# Patient Record
Sex: FEMALE | Race: WHITE
[De-identification: names, ages, dates, MRNs, and addresses within clinical notes are randomized per-mention and may not be internally consistent; named-entity substitution may affect disease eponyms.]

---

## 2017-08-03 ENCOUNTER — HOSPITAL ENCOUNTER (OUTPATIENT)
Dept: HOSPITAL 63 - MAMMO | Age: 67
Discharge: HOME | End: 2017-08-03
Attending: SPECIALIST
Payer: MEDICARE

## 2017-08-03 DIAGNOSIS — Z00.00: ICD-10-CM

## 2017-08-03 DIAGNOSIS — Z12.31: Primary | ICD-10-CM

## 2017-08-03 PROCEDURE — 77063 BREAST TOMOSYNTHESIS BI: CPT

## 2017-08-03 NOTE — RAD
DATE: 8/3/2017



EXAM: MAMMO ADEOLA SCREENING BILATERAL



HISTORY: Routine screening



COMPARISON: 8/2/2016



This study was interpreted with the benefit of Computerized Aided Detection

(CAD).



The breast parenchyma shows scattered fibroglandular densities. Breast

parenchyma level B.



FINDINGS: 2-D and 3-D tomosynthesis imaging was performed in CC and MLO

projections. The fibroglandular pattern is nodular in character. The smooth

nodule seen inferomedially in the right breast on the previous study has

regressed. A prior ultrasound study suggests that this was a cyst. No new or

enlarging breast densities are seen. Benign type calcifications are present.

No suspicious microcalcifications have developed. 





IMPRESSION: Stable mammograms without evidence of malignancy.





BI-RADS CATEGORY: 2 BENIGN FINDING(S)



RECOMMENDED FOLLOW-UP: 12M 12 MONTH FOLLOW-UP



PQRS compliance statement: Patient information was entered into a reminder

system with a target due date     for the next mammogram.



Mammography is a sensitive method for finding small breast cancers, but it

does not detect them all and is not a substitute for careful clinical

examination.  A negative mammogram does not negate a clinically suspicious

finding and should not result in delay in biopsying a clinically suspicious

abnormality.



"Our facility is accredited by the American [employer] Mammography

Program."

## 2018-08-14 ENCOUNTER — HOSPITAL ENCOUNTER (OUTPATIENT)
Dept: HOSPITAL 63 - DXRAD | Age: 68
Discharge: HOME | End: 2018-08-14
Attending: SPECIALIST
Payer: MEDICARE

## 2018-08-14 DIAGNOSIS — Z13.820: ICD-10-CM

## 2018-08-14 DIAGNOSIS — Z12.31: Primary | ICD-10-CM

## 2018-08-14 DIAGNOSIS — M85.88: ICD-10-CM

## 2018-08-14 DIAGNOSIS — E78.00: ICD-10-CM

## 2018-08-14 PROCEDURE — 77080 DXA BONE DENSITY AXIAL: CPT

## 2018-08-14 PROCEDURE — 77063 BREAST TOMOSYNTHESIS BI: CPT

## 2018-08-14 PROCEDURE — 77067 SCR MAMMO BI INCL CAD: CPT

## 2018-08-14 NOTE — RAD
Bone densitometry 8/14/2018 10:16 AM

 

Indication: screening, ov failure, osteopenia

 

Comparison Study: DEXA scan August 2, 2016..

 

Discussion:   Bone Densitometry was performed with dual photon absorption 

of the lumbar spine and proximal femurs.

 

Lumbar Spine:  Bone average density is 1.069g/cm2 for L1. T-Score is -0.9 

(prior   T score -1.9).

 

Right femoral neck:  Bone average density is 0.758 g/cm2.  T-Score is 

-2.0.  (Prior T score -2.3)

 

 

IMPRESSION: 

 

1. Apparent increased bone marrow density of the lumbar spine in the 

interim with T score now in the low normal range.

 

2. Persistent significant osteopenia of the left femoral neck.

 

3. Continued imaging follow-up recommended.

 

Note: Definitions established by the World Health Organization:

   Normal: T-score is -1.0 or above.

   Osteopenia: T-score is between -1.0 and -2.5.

   Osteoporosis: T-score is -2.5 or below. 

 

 

Electronically signed by: Barry Mi MD (8/14/2018 1:59 PM) [id number]

## 2018-08-15 NOTE — RAD
3d digital tomography Bilateral



History:  Routine screening



Technique:  Bilateral 3d digital tomographic views were obtained.  In

addition, CAD - computer aided detection was utilized. 



Comparison:  12/7/2012, 12/10/2013, 8/2/2016, 8/3/2017.



Findings:



Breast Tissue Density C :  The breast tissue is heterogeneously dense.

Scattered fibroglandular elements may obscure underlying pathology.



There are no suspicious masses, microcalcifications or areas of architectural

distortion.



Impression:



No suspicious findings.



BI-RADS Category 1:  Negative.



Normal interval followup.



The patient will receive a letter with the results in the mail.



Your mammogram demonstrates that you have dense breast tissue, which could

hide abnormalities, and if you have other risk factors for breast cancer that

have been identified, you might benefit from supplemental screening tests that

may be suggested by your ordering physician.  Dense breast tissue, in and of

itself, is a relatively common condition.  This information is not provided to

cause undue concern, but rather to raise your awareness and to promote

discussion with your physician regarding the presence of other risk factors,

in addition to dense breast tissue. A report of your mammography results will

be sent to you and your physician.  You should contact your physician if you

have any questions or concerns regarding this report.





A mammogram does not have 100% sensitivity and therefore a negative imaging

study should not delay further work up of a suspicious abnormality.



Patient information is entered into the [facility] reminder system using [employer] with

a target due date for the next screening mammogram.  The patient will receive

a reminder.



"Our facility is accredited by the American [employer] Mammography

Program."

## 2019-08-19 ENCOUNTER — HOSPITAL ENCOUNTER (OUTPATIENT)
Dept: HOSPITAL 63 - MAMMO | Age: 69
Discharge: HOME | End: 2019-08-19
Attending: SPECIALIST
Payer: MEDICARE

## 2019-08-19 DIAGNOSIS — N64.89: ICD-10-CM

## 2019-08-19 DIAGNOSIS — Z12.31: Primary | ICD-10-CM

## 2019-08-19 PROCEDURE — 77067 SCR MAMMO BI INCL CAD: CPT

## 2019-08-19 PROCEDURE — 77063 BREAST TOMOSYNTHESIS BI: CPT

## 2019-08-19 NOTE — RAD
DATE: 8/19/2019.



EXAM: MAMMO ADEOLA SCREENING BILATERAL



HISTORY: Routine mammogram.



COMPARISON: Previous mammogram from 2018 and 2017.



This study was interpreted with the benefit of Computerized Aided Detection

(CAD).



FINDINGS:



Breast Density: HETERO  The breast parenchyma Is heterogeneously dense, which

could reduce sensitivity of mammography. Breast parenchyma level C. 



The skin and nipples are within normal limits.  No suspicious calcifications,

spiculated mass or area of architectural distortion.  Stable bilateral nodular

opacities are seen.







IMPRESSION: No mammographic evidence of malignancy.







BI-RADS CATEGORY: 2 BENIGN FINDING(S)



RECOMMENDED FOLLOW-UP: 12M 12 MONTH FOLLOW-UP



PQRS compliance statement: Patient information was entered into a reminder

system with a target due date for the next mammogram.



Mammography is a sensitive method for finding small breast cancers, but it

does not detect them all and is not a substitute for careful clinical

examination.  A negative mammogram does not negate a clinically suspicious

finding and should not result in delay in biopsying a clinically suspicious

abnormality.



"Our facility is accredited by the American [employer] of [employer] Mammography

Program."

## 2021-08-05 ENCOUNTER — HOSPITAL ENCOUNTER (OUTPATIENT)
Dept: HOSPITAL 63 - MAMMO | Age: 71
End: 2021-08-05
Attending: SPECIALIST
Payer: MEDICARE

## 2021-08-05 DIAGNOSIS — Z00.00: ICD-10-CM

## 2021-08-05 DIAGNOSIS — Z78.0: ICD-10-CM

## 2021-08-05 DIAGNOSIS — Z12.31: Primary | ICD-10-CM

## 2021-08-05 PROCEDURE — 77063 BREAST TOMOSYNTHESIS BI: CPT

## 2021-08-05 PROCEDURE — 77067 SCR MAMMO BI INCL CAD: CPT

## 2021-08-05 PROCEDURE — 77080 DXA BONE DENSITY AXIAL: CPT

## 2021-08-05 NOTE — RAD
EXAM: DUAL ENERGY X-RAY ABSORPTIOMETRY (DEXA).



HISTORY: Postmenopausal screening.



FINDINGS: The lowest measured T-score is -1.6 in the right hip, based on a bone mineral density of 0.
754 g/cm^2. Refer to the worksheets for full detail.



There has been a 1.2 percent decrease in density of the right hip and 3.4 percent increase in density
 of the lumbar spine compared to a study performed 8/2/2016.



IMPRESSION:

1. Low bone mass. Bone mineral density yields a T-score between -1.0 and -2.5. Fracture risk is incre
ased.

2. FRAX report: Not calculated.





METHODOLOGY: Dual energy x-ray absorptiometry was performed to measure bone mineral density. The foll
owing analysis is based on the 2019 Official Positions of the International Society for Clinical Dens
itometry: 



Measurements of the hips and the average of L1-L4 are preferred. When the spine and/or hip cannot be 
feasibly measured or interpreted, or in the setting of hyperparathyroidism, distal radial bone minera
l density may be measured. 



The lumbar spine T-score is based on the average bone mineral density of L1-L4. In the setting of art
ifact or anatomic abnormality, some lumbar levels may be excluded, and the remaining levels used for 
calculation. A single lumbar level is not used for diagnosis, and if only a single level is available
 for assessment, another anatomic site will be used to assign a diagnosis.



The hip T-score is based on the bone mineral density measurement of the femoral neck or total proxima
l femur of either side, whichever is lowest. Bilateral mean values are not used for diagnosis.



The forearm T-score is derived from 33% of the distal radius of the nondominant forearm.



Electronically signed by: Kami Vasquez MD (8/5/2021 12:19 PM) [id number]

## 2021-08-05 NOTE — RAD
EXAM: Bilateral digital screening mammogram with tomosynthesis.



HISTORY: 70-year-old female presents for screening mammography.



TECHNIQUE: Full-field digital craniocaudal and mediolateral oblique 2D and 3D tomosynthesis images of
 both breasts are obtained for evaluation. Computer aided detection was applied.



COMPARISON: 8/19/2019



BREAST PARENCHYMAL DENSITY: Level C - Heterogeneously dense.



FINDINGS: There is no new suspicious mass, microcalcification or region of architectural distortion. 
There is stable areas of benign nodularity and asymmetry within both breasts.



IMPRESSION: BI-RADS Category 2: Benign finding(s). 



RECOMMENDATION: Annual mammography is recommended.



If your mammogram demonstrates that you have dense breast tissue, which could hide abnormalities, and
 if you have other risk factors for breast cancer that have been identified, you might benefit from s
upplemental screening tests that may be suggested by your ordering physician.  Dense breast tissue, i
n and of itself, is a relatively common condition.  This information is not provided to cause undue c
oncern, but rather to raise your awareness and to promote discussion with your physician regarding th
e presence of other risk factors, in addition to dense breast tissue. A report of your mammography re
sults will be sent to you and your physician.  You should contact your physician if you have any ques
tions or concerns regarding this report.  



Mammography is a sensitive method for finding small breast cancers, but it does not detect them all a
nd is not a substitute for careful clinical examination.  A negative mammogram does not negate a clin
ically suspicious finding and should not result in delay in biopsying a clinically suspicious abnorma
lity.



PQRS compliance statement -  Patient information was entered into a reminder system with a target due
 date for the next mammogram. 



"Our facility is accredited by the American [employer] of [employer] Mammography Program."



Electronically signed by: Kami Vasquez MD (8/5/2021 11:30 AM) [id number]

## 2022-04-12 ENCOUNTER — HOSPITAL ENCOUNTER (OUTPATIENT)
Dept: HOSPITAL 63 - RAD | Age: 72
End: 2022-04-12
Attending: SPECIALIST
Payer: MEDICARE

## 2022-04-12 DIAGNOSIS — M43.16: ICD-10-CM

## 2022-04-12 DIAGNOSIS — M47.817: Primary | ICD-10-CM

## 2022-04-12 DIAGNOSIS — M48.07: ICD-10-CM

## 2022-04-12 PROCEDURE — 72100 X-RAY EXAM L-S SPINE 2/3 VWS: CPT

## 2022-04-12 NOTE — RAD
XR LUMBAR SPINE 2-3V



History: Back pain from lifting repeatedly.

Comparison: None.

Technique: 3 views of the lumbar spine.



Findings:

There are 5 non-rib bearing lumbar vertebral segments.

There is no evidence of fracture. No destructive osseous lesions.

Mild anterolisthesis of L4 on L5.

L4-L5 and L5-S1 facet hypertrophy.

Moderate to severe disc space narrowing L1-L2 and L5-S1.

Sacroiliac joints are unremarkable.

Round calcifications projecting at the level of L4-L5 over the right psoas likely phleboliths.



IMPRESSION:

1.  Degenerative disc and facet disease of the lumbar spine with mild anterolisthesis of L4 on L5.



Electronically signed by: Nolan Marshall MD (4/12/2022 3:13 PM) [id number]